# Patient Record
Sex: MALE | URBAN - METROPOLITAN AREA
[De-identification: names, ages, dates, MRNs, and addresses within clinical notes are randomized per-mention and may not be internally consistent; named-entity substitution may affect disease eponyms.]

---

## 2021-11-17 ENCOUNTER — OFFICE VISIT (OUTPATIENT)
Dept: URBAN - METROPOLITAN AREA CLINIC 21 | Facility: CLINIC | Age: 46
End: 2021-11-17

## 2021-11-17 DIAGNOSIS — H04.123 TEAR FILM INSUFFICIENCY OF BILATERAL LACRIMAL GLANDS: ICD-10-CM

## 2021-11-17 PROCEDURE — 99203 OFFICE O/P NEW LOW 30 MIN: CPT | Performed by: OPTOMETRIST

## 2021-11-17 RX ORDER — NEOMYCIN SULFATE, POLYMYXIN B SULFATE AND DEXAMETHASONE 3.5; 10000; 1 MG/ML; [USP'U]/ML; MG/ML
SUSPENSION OPHTHALMIC
Qty: 5 | Refills: 0 | Status: INACTIVE
Start: 2021-11-17 | End: 2021-11-17

## 2021-11-17 RX ORDER — NEOMYCIN SULFATE, POLYMYXIN B SULFATE AND DEXAMETHASONE 3.5; 10000; 1 MG/ML; [USP'U]/ML; MG/ML
SUSPENSION OPHTHALMIC
Qty: 5 | Refills: 0 | Status: INACTIVE
Start: 2021-11-17 | End: 2021-12-29

## 2021-11-17 ASSESSMENT — INTRAOCULAR PRESSURE
OD: 18
OS: 18

## 2021-11-17 NOTE — IMPRESSION/PLAN
Impression: Angular blepharoconjunctivitis, bilateral: H10.523. Plan: Meibomian gland dysfunction:  Warm compress 2x/day, lid scrubs as directed. Use Ramírez/poly/dex, 3x/day for 1 week, taper over 3 weeks. Use artificial tears 4x/day as directed, OU. Omega 3 vitamins recommended.

## 2021-12-01 ENCOUNTER — OFFICE VISIT (OUTPATIENT)
Dept: URBAN - METROPOLITAN AREA CLINIC 21 | Facility: CLINIC | Age: 46
End: 2021-12-01

## 2021-12-01 DIAGNOSIS — H10.523 ANGULAR BLEPHAROCONJUNCTIVITIS, BILATERAL: Primary | ICD-10-CM

## 2021-12-01 PROCEDURE — 99212 OFFICE O/P EST SF 10 MIN: CPT | Performed by: OPTOMETRIST

## 2021-12-01 NOTE — IMPRESSION/PLAN
Impression: Resolving, Angular blepharoconjunctivitis, bilateral: H10.523. Plan: Meibomian gland dysfunction:  Warm compress 2x/day, lid scrubs as directed. Use Ramírez/poly/dex, 1x/day for 1 week, taper over 3 weeks. Use artificial tears 4x/day as directed, OU. Omega 3 vitamins recommended.

## 2021-12-29 ENCOUNTER — OFFICE VISIT (OUTPATIENT)
Dept: URBAN - METROPOLITAN AREA CLINIC 21 | Facility: CLINIC | Age: 46
End: 2021-12-29

## 2021-12-29 PROCEDURE — 99212 OFFICE O/P EST SF 10 MIN: CPT | Performed by: OPTOMETRIST

## 2021-12-29 RX ORDER — NEOMYCIN SULFATE, POLYMYXIN B SULFATE AND DEXAMETHASONE 3.5; 10000; 1 MG/ML; [USP'U]/ML; MG/ML
SUSPENSION OPHTHALMIC
Qty: 5 | Refills: 1 | Status: ACTIVE
Start: 2021-12-29

## 2021-12-29 ASSESSMENT — INTRAOCULAR PRESSURE
OD: 19
OS: 19

## 2021-12-29 NOTE — IMPRESSION/PLAN
Impression: Angular blepharoconjunctivitis, bilateral: H10.523. Plan: Recommended artificial tears, 4x/day. Discussed proper hydration of body. Sunglasses will help with wind exposure.

## 2022-02-16 ENCOUNTER — OFFICE VISIT (OUTPATIENT)
Dept: URBAN - METROPOLITAN AREA CLINIC 21 | Facility: CLINIC | Age: 47
End: 2022-02-16

## 2022-02-16 DIAGNOSIS — H40.053 OCULAR HYPERTENSION, BILATERAL: ICD-10-CM

## 2022-02-16 PROCEDURE — 99212 OFFICE O/P EST SF 10 MIN: CPT | Performed by: OPTOMETRIST

## 2022-02-16 ASSESSMENT — INTRAOCULAR PRESSURE
OD: 35
OS: 28
OD: 28
OS: 35

## 2022-02-16 NOTE — IMPRESSION/PLAN
Impression: Ocular hypertension, bilateral: H40.053.  Plan: pt a steroid responder stop PF, Disp Betimol to use BID ou

## 2022-02-16 NOTE — IMPRESSION/PLAN
Impression: Angular blepharoconjunctivitis, bilateral: H10.523. Plan: advice to DC pred forte, and continue systane QID

## 2022-03-02 ENCOUNTER — OFFICE VISIT (OUTPATIENT)
Dept: URBAN - METROPOLITAN AREA CLINIC 21 | Facility: CLINIC | Age: 47
End: 2022-03-02

## 2022-03-02 PROCEDURE — 99211 OFF/OP EST MAY X REQ PHY/QHP: CPT | Performed by: OPTOMETRIST

## 2022-03-02 ASSESSMENT — INTRAOCULAR PRESSURE
OS: 26
OD: 26

## 2022-03-02 NOTE — IMPRESSION/PLAN
Impression: Ocular hypertension, bilateral: H40.053. Plan: Sample lumigan, qhs, cont.  t1/2, bid, OU

## 2022-03-23 ENCOUNTER — OFFICE VISIT (OUTPATIENT)
Dept: URBAN - METROPOLITAN AREA CLINIC 21 | Facility: CLINIC | Age: 47
End: 2022-03-23

## 2022-03-23 PROCEDURE — 99211 OFF/OP EST MAY X REQ PHY/QHP: CPT | Performed by: OPTOMETRIST

## 2022-03-23 RX ORDER — LATANOPROST 50 UG/ML
0.005 % SOLUTION OPHTHALMIC
Qty: 2.5 | Refills: 10 | Status: ACTIVE
Start: 2022-03-23

## 2022-03-23 RX ORDER — TIMOLOL MALEATE 5 MG/ML
0.5 % SOLUTION/ DROPS OPHTHALMIC
Qty: 2.5 | Refills: 10 | Status: ACTIVE
Start: 2022-03-23

## 2022-03-23 RX ORDER — LATANOPROST/PF 0.005 %
0.005 % DROPS OPHTHALMIC (EYE)
Qty: 30 | Refills: 0 | Status: ACTIVE
Start: 2022-03-23

## 2022-03-23 ASSESSMENT — INTRAOCULAR PRESSURE
OD: 14
OS: 14

## 2022-03-23 NOTE — IMPRESSION/PLAN
Impression: Ocular hypertension, bilateral: H40.053. Plan: Visual field, OCT, IOPs and condition are stable. Patient to continue current treatment and drops at this time.  
Continue latanoprost, qhs, T1/2 , bid, ou

## 2022-09-14 ENCOUNTER — OFFICE VISIT (OUTPATIENT)
Dept: URBAN - METROPOLITAN AREA CLINIC 21 | Facility: CLINIC | Age: 47
End: 2022-09-14

## 2022-09-14 DIAGNOSIS — H40.053 OCULAR HYPERTENSION, BILATERAL: Primary | ICD-10-CM

## 2022-09-14 DIAGNOSIS — H04.123 TEAR FILM INSUFFICIENCY OF BILATERAL LACRIMAL GLANDS: ICD-10-CM

## 2022-09-14 PROCEDURE — 99212 OFFICE O/P EST SF 10 MIN: CPT | Performed by: OPTOMETRIST

## 2022-09-14 ASSESSMENT — INTRAOCULAR PRESSURE
OD: 15
OS: 14

## 2022-09-14 NOTE — IMPRESSION/PLAN
Impression: Ocular hypertension, bilateral: H40.053. Plan: Patient to continue Timolol and Latanoprost.  Will continue to monitor intraocular pressure and testing in clinic at regular intervals. No treatment is being implemented at this time. RTC: 6month for F/U and repeat Visual Field 24-2 yearly (03/2023)

## 2022-12-07 ENCOUNTER — OFFICE VISIT (OUTPATIENT)
Dept: URBAN - METROPOLITAN AREA CLINIC 21 | Facility: CLINIC | Age: 47
End: 2022-12-07

## 2022-12-07 DIAGNOSIS — H43.22 CRYSTALLINE DEPOSITS IN VITREOUS BODY, LEFT EYE: ICD-10-CM

## 2022-12-07 DIAGNOSIS — H40.053 OCULAR HYPERTENSION, BILATERAL: Primary | ICD-10-CM

## 2022-12-07 PROCEDURE — 99213 OFFICE O/P EST LOW 20 MIN: CPT

## 2022-12-07 PROCEDURE — 92133 CPTRZD OPH DX IMG PST SGM ON: CPT

## 2022-12-07 ASSESSMENT — INTRAOCULAR PRESSURE
OD: 16
OS: 16

## 2022-12-07 NOTE — IMPRESSION/PLAN
Impression: Crystalline deposits in vitreous body, left eye: H43.22. Plan: Pt educated on findings. Condition is stable. Monitor yearly.

## 2022-12-07 NOTE — IMPRESSION/PLAN
Impression: Ocular hypertension, bilateral: H40.053. Plan: Pt educated on findings. RNFL shows Stable, ou. 103/99. Continue on gtts, latanoprost QHS and d/c Betimol BID OU. RTC in 3 months for IOP check and HVF.

## 2024-02-07 ENCOUNTER — OFFICE VISIT (OUTPATIENT)
Dept: URBAN - METROPOLITAN AREA CLINIC 21 | Facility: CLINIC | Age: 49
End: 2024-02-07

## 2024-02-07 DIAGNOSIS — H40.053 OCULAR HYPERTENSION, BILATERAL: Primary | ICD-10-CM

## 2024-02-07 DIAGNOSIS — H04.123 TEAR FILM INSUFFICIENCY OF BILATERAL LACRIMAL GLANDS: ICD-10-CM

## 2024-02-07 PROCEDURE — 99212 OFFICE O/P EST SF 10 MIN: CPT

## 2024-02-07 RX ORDER — TIMOLOL MALEATE 5 MG/ML
0.5 % SOLUTION/ DROPS OPHTHALMIC
Qty: 7.5 | Refills: 3 | Status: ACTIVE
Start: 2024-02-07

## 2024-02-07 RX ORDER — ERYTHROMYCIN 5 MG/G
OINTMENT OPHTHALMIC
Qty: 3.5 | Refills: 0 | Status: ACTIVE
Start: 2024-02-07

## 2024-02-07 RX ORDER — LATANOPROST 50 UG/ML
0.005 % SOLUTION OPHTHALMIC
Qty: 7.5 | Refills: 3 | Status: ACTIVE
Start: 2024-02-07

## 2024-02-07 ASSESSMENT — INTRAOCULAR PRESSURE
OD: 24
OS: 24

## 2025-03-26 ENCOUNTER — OFFICE VISIT (OUTPATIENT)
Dept: URBAN - METROPOLITAN AREA CLINIC 21 | Facility: CLINIC | Age: 50
End: 2025-03-26

## 2025-03-26 DIAGNOSIS — H40.053 OCULAR HYPERTENSION, BILATERAL: Primary | ICD-10-CM

## 2025-03-26 DIAGNOSIS — H04.123 TEAR FILM INSUFFICIENCY OF BILATERAL LACRIMAL GLANDS: ICD-10-CM

## 2025-03-26 DIAGNOSIS — H43.22 CRYSTALLINE DEPOSITS IN VITREOUS BODY, LEFT EYE: ICD-10-CM

## 2025-03-26 PROCEDURE — 99213 OFFICE O/P EST LOW 20 MIN: CPT

## 2025-03-26 RX ORDER — LATANOPROST 50 UG/ML
0.005 % SOLUTION OPHTHALMIC
Qty: 7.5 | Refills: 3 | Status: ACTIVE
Start: 2025-03-26

## 2025-03-26 RX ORDER — TIMOLOL MALEATE 5 MG/ML
0.5 % SOLUTION/ DROPS OPHTHALMIC
Qty: 7.5 | Refills: 3 | Status: ACTIVE
Start: 2025-03-26

## 2025-03-26 ASSESSMENT — INTRAOCULAR PRESSURE
OD: 21
OS: 22
OS: 23

## 2025-03-26 ASSESSMENT — VISUAL ACUITY
OD: 20/20
OS: 20/20